# Patient Record
(demographics unavailable — no encounter records)

---

## 2025-06-03 NOTE — PLAN
[TextEntry] : Post Operative Care:  Pt advised of importance of daily weights. Pt advised to call [facility] NP for weight gain of 3 lbs or more. Pt instructed on how to use incentive spirometer every hour, demonstrated proper use.  Pt encouraged to ambulate as much as tolerated, avoiding extreme temperatures outdoors.  Also advised to cleanse incisions daily with mild soap and water and to avoid lotions, powders, ointments or creams near or on the incision.  Low salt, low fat diet encouraged and discussed.  Pt advised to avoid heavy lifting or straining. Pt advised no driving until cleared by Dr. Gonzales.     [facility] team will continue to follow up with pt status.  NP/CCC roles explained with pt understanding, contact information provided. Pt agrees to call with any questions, issues or concerns.  Worsening symptoms reviewed with patient understanding.      FOLLOW UP APPOINTMENTS:  CTS: Dr. Gonzales 6/9/25  CARDIOLOGIST: Dr. Whitaker 2 weeks  EPS- Dr. Blair 2 weeks  PCP: Pt encouraged to follow up within one month of discharge

## 2025-06-03 NOTE — PHYSICAL EXAM
[Sclera] : the sclera and conjunctiva were normal [PERRL With Normal Accommodation] : pupils were equal in size, round, and reactive to light [Neck Appearance] : the appearance of the neck was normal [Respiration, Rhythm And Depth] : normal respiratory rhythm and effort [Exaggerated Use Of Accessory Muscles For Inspiration] : no accessory muscle use [Auscultation Breath Sounds / Voice Sounds] : lungs were clear to auscultation bilaterally [Apical Impulse] : the apical impulse was normal [Heart Rate And Rhythm] : heart rate was normal and rhythm regular [Heart Sounds] : normal S1 and S2 [Heart Sounds Gallop] : no gallops [Murmurs] : no murmurs [Heart Sounds Pericardial Friction Rub] : no pericardial rub [Examination Of The Chest] : the chest was normal in appearance [Chest Visual Inspection Thoracic Asymmetry] : no chest asymmetry [Diminished Respiratory Excursion] : normal chest expansion [2+] : left 2+ [1+] : left 1+ [Bowel Sounds] : normal bowel sounds [Abdomen Soft] : soft [Abdomen Tenderness] : non-tender [Nail Clubbing] : no clubbing  or cyanosis of the fingernails [Involuntary Movements] : no involuntary movements were seen [Skin Color & Pigmentation] : normal skin color and pigmentation [Skin Turgor] : normal skin turgor [] : no rash [No Focal Deficits] : no focal deficits [Oriented To Time, Place, And Person] : oriented to person, place, and time [Impaired Insight] : insight and judgment were intact [Affect] : the affect was normal [Mood] : the mood was normal [Memory Recent] : recent memory was not impaired [Memory Remote] : remote memory was not impaired [FreeTextEntry1] : CT sites clean, dry, and intact. Left SVG site clean, dry, and intact. B/L LE edema +2 pitting edema to feet and calves.

## 2025-06-03 NOTE — ASSESSMENT
[FreeTextEntry1] : S/p CABG x3- continue Asa, Atorvastatin, Furosemide, Metoprolol Tartrate, and Potassium.  DM- pt does not have a blood glucose monitor at home, RX sent to pharmacy for blood glucose monitor, test strips, and lancets. Pt advised to check blood glucose each morning fasting and record reading. Discussed consistent carb diet.

## 2025-06-03 NOTE — HISTORY OF PRESENT ILLNESS
[Diabetes Mellitus] : Diabetes Mellitus [Diet] : controlled with diet [Dyslipidemia] : Dyslipidemia [Hypertension] : Hypertension [Inhaled Medication Therapy] : Inhaled Medication Therapy [FreeTextEntry1] : 76 yo M, former smoker (30 years, quit 2024) PMH of HTN, T2DM (A1C 7.9), HLD, COPD (not on home O2, no recent hospitalizations), prostate cancer s/p prostate seed implant who was recently found to have PET positive left lower lobe 1.5cm lung nodule. During cardiac clearance for wedge resection, he was found to have ischemia after dye injection of a nuclear scan and was transferred to [facility] for cardiac cath. On 5/24/25, he underwent a cath that revealed severe 3V CAD, including  of the RCA with left-right collaterals, severe distal LM disease, severe proximal LAD, and moderate to severe proximal LCX stenosis. On 5/25/25, he was transferred to [facility] under the service of Dr. Gonzales for further management of his CAD prior to intervention for his lung nodule. On 5/26/25, US carotids revealed antegrade flow to vertebral and no carotid stenosis. On 5/27/25, TTE showed EF 44% with akinetic basal and mid inferior wall and septum, entire apex, and entire anterior, basal and mid anterolateral wall hypokinesis. Vein mapping performed with right leg viable for conduit. PST was completed and P2Y12 trended to acceptable level and patient underwent three vessel CABG (LIMA-LAD, SVG-OM, SVG-PDA) on 5/28/25. Intraop course was uneventful. Patient was received in CTICU intubated on no drips with quick extubation to BIPAP (given COPD history) after arrival. POD#1 Patient was noted to have nonconducting P waves for which EP was consulted. He was weaned off HFNC. Substernal and left pleural chest tubes with air leak for which clamp trial was attempted and failed. POD#1 Substernal chest tube clamp trial passed and removed. TTE done prior to PPM insertion revealed EF 45% and no pericardial effusion. Patient underwent dual chamber (Medronic DDD ) pacemaker implant. Left pleural chest tube clamp trial passed. POD#3 PPM interrogated and epicardial wires removed. Left pleural chest tube removed. Continued diuresis. Azithromycin and Rocephin started for tracheobronchitis. POD#4 Lopressor started and tolerated. Lasix held. Of note, patient did not want Oxycodone Rx for discharge. Per Dr. Gonzales, patient is being discharged on Lasix PO and Augmentin x 5 days (for tracheobronchitis). Per Dr. Gonzales, patient is stable for discharge with follow up. Mr. Moore is recovering at home with his wife Emilee; they live with their son and daughter in law.  He is ambulating independently.  Mr Moore verbalized feeling well and sleeping well last night.  Pt denies SOB, incisional pain, abdominal pain, constipation, and urinary retention. [facility] to initiate care.

## 2025-06-11 NOTE — REASON FOR VISIT
[Spouse] : spouse [de-identified] : CABG x 3  (LIMA-LAD, SVG-OM, SVG-PDA) EF 45% [de-identified] : 5/28/25 [de-identified] : Intraop uncomplicated. To ICU intubated on no drip. Extubated to bipap on POD 0. POD 1  Patient was noted to have nonconducting P waves for which EP was consulted. He was weaned off HFNC. Substernal and left pleural chest tubes with air leak for which clamp trial was attempted and failed. POD#1 Substernal chest tube clamp trial passed and removed. TTE done prior to PPM insertion revealed EF 45% and no pericardial effusion. POD s/p PPM by EP. POD 3 ppm interrogated. Azithromycin and rocephin started for tracheobronchitis. Patient discharged home on 6/2.  chest xray today: small effusions. Patient is recuperating well from surgery. He is ambulating and increasing his activities daily. Patient denies fever, chills, dizziness, syncope, shortness of breath, chest pain, palpitations. + peripheral edema   CMP: na 140 K 4.0 BUN 28 creatinine 0.97 LFTs normal

## 2025-06-11 NOTE — REASON FOR VISIT
[Spouse] : spouse [de-identified] : CABG x 3  (LIMA-LAD, SVG-OM, SVG-PDA) EF 45% [de-identified] : 5/28/25 [de-identified] : Intraop uncomplicated. To ICU intubated on no drip. Extubated to bipap on POD 0. POD 1  Patient was noted to have nonconducting P waves for which EP was consulted. He was weaned off HFNC. Substernal and left pleural chest tubes with air leak for which clamp trial was attempted and failed. POD#1 Substernal chest tube clamp trial passed and removed. TTE done prior to PPM insertion revealed EF 45% and no pericardial effusion. POD s/p PPM by EP. POD 3 ppm interrogated. Azithromycin and rocephin started for tracheobronchitis. Patient discharged home on 6/2.  chest xray today: small effusions. Patient is recuperating well from surgery. He is ambulating and increasing his activities daily. Patient denies fever, chills, dizziness, syncope, shortness of breath, chest pain, palpitations. + peripheral edema   CMP: na 140 K 4.0 BUN 28 creatinine 0.97 LFTs normal

## 2025-06-11 NOTE — PHYSICAL EXAM
[] : no respiratory distress [Auscultation Breath Sounds / Voice Sounds] : lungs were clear to auscultation bilaterally [Heart Rate And Rhythm] : heart rate was normal and rhythm regular [Heart Sounds] : normal S1 and S2 [Heart Sounds Gallop] : no gallops [Murmurs] : no murmurs [Heart Sounds Pericardial Friction Rub] : no pericardial rub [Site: ___] : Site: [unfilled] [Clean] : clean [Dry] : dry [Healing Well] : healing well [FreeTextEntry1] : PPM

## 2025-06-11 NOTE — PHYSICAL EXAM
Anesthesia Pre Eval Note    Anesthesia ROS/Med Hx        Anesthetic Complication History:    Patient does not have a history of anesthetic complications      Pulmonary Review:    Current smoker     Neuro/Psych Review:  Patient does not have a neuro/psych history         Cardiovascular Review:   Exercise tolerance: good (>4 METS)  Positive for hyperlipidemia    GI/HEPATIC/RENAL Review:  Patient does not have a GI/hepatic/renalhistory       End/Other Review:  Patient does not have an endo/other history        Relevant Problems   No relevant active problems       Physical Exam     Airway   Mallampati: II  TM Distance: >3 FB  Neck ROM: Full  Neck: Able to place in sniff position  TMJ Mobility: Good    Cardiovascular  Cardiovascular exam normal    General Assessment  General Assessment: Alert and oriented and No acute distress    Dental Exam  Dental exam normal    Pulmonary Exam  Pulmonary exam normal    Abdominal Exam  Abdominal exam normal      Anesthesia Plan:    ASA Status: 2  Anesthesia Type: MAC    Induction: Intravenous  Maintenance: TIVA    Post-op Pain Management: Per Surgeon      Checklist  Reviewed: Lab Results, Nursing Notes, Allergies, Medications, Past Med History, Problem list, NPO Status and Patient Summary  Consent/Risks Discussed Statement:  The proposed anesthetic plan, including its risks and benefits, have been discussed with the Patient along with the risks and benefits of alternatives. Questions were encouraged and answered and the patient and/or representative understands and agrees to proceed.        I discussed with the patient (and/or patient's legal representative) the risks and benefits of the proposed anesthesia plan, MAC, which may include services performed by other anesthesia providers.    Alternative anesthesia plans, if available, were reviewed with the patient (and/or patient's legal representative). Discussion has been held with the patient (and/or patient's legal representative)  regarding risks of anesthesia, which include  emergent situations that may require change in anesthesia plan.    The patient (and/or patient's legal representative) has indicated understanding, his/her questions have been answered, and he/she wishes to proceed with the planned anesthetic.     [] : no respiratory distress [Auscultation Breath Sounds / Voice Sounds] : lungs were clear to auscultation bilaterally [Heart Rate And Rhythm] : heart rate was normal and rhythm regular [Heart Sounds] : normal S1 and S2 [Heart Sounds Gallop] : no gallops [Murmurs] : no murmurs [Heart Sounds Pericardial Friction Rub] : no pericardial rub [Site: ___] : Site: [unfilled] [Clean] : clean [Dry] : dry [Healing Well] : healing well [FreeTextEntry1] : PPM

## 2025-06-11 NOTE — END OF VISIT
[FreeTextEntry3] : I, DELONTE Peace , personally performed the evaluation and management (E/M) services for this established patient who presents today with (a) new problem(s)/exacerbation of (an) existing condition(s).  That E/M includes conducting the clinically appropriate interval history &/or exam, assessing all new/exacerbated conditions, and establishing a new plan of care.  Today, my MEGAN, was here to observe &/or participate in the visit & follow plan of care established by me.

## 2025-06-11 NOTE — REASON FOR VISIT
[Spouse] : spouse [de-identified] : CABG x 3  (LIMA-LAD, SVG-OM, SVG-PDA) EF 45% [de-identified] : 5/28/25 [de-identified] : Intraop uncomplicated. To ICU intubated on no drip. Extubated to bipap on POD 0. POD 1  Patient was noted to have nonconducting P waves for which EP was consulted. He was weaned off HFNC. Substernal and left pleural chest tubes with air leak for which clamp trial was attempted and failed. POD#1 Substernal chest tube clamp trial passed and removed. TTE done prior to PPM insertion revealed EF 45% and no pericardial effusion. POD s/p PPM by EP. POD 3 ppm interrogated. Azithromycin and rocephin started for tracheobronchitis. Patient discharged home on 6/2.  chest xray today: small effusions. Patient is recuperating well from surgery. He is ambulating and increasing his activities daily. Patient denies fever, chills, dizziness, syncope, shortness of breath, chest pain, palpitations. + peripheral edema   CMP: na 140 K 4.0 BUN 28 creatinine 0.97 LFTs normal

## 2025-06-11 NOTE — PHYSICAL EXAM
Yes [] : no respiratory distress [Auscultation Breath Sounds / Voice Sounds] : lungs were clear to auscultation bilaterally [Heart Rate And Rhythm] : heart rate was normal and rhythm regular [Heart Sounds] : normal S1 and S2 [Heart Sounds Gallop] : no gallops [Murmurs] : no murmurs [Heart Sounds Pericardial Friction Rub] : no pericardial rub [Site: ___] : Site: [unfilled] [Clean] : clean [Dry] : dry [Healing Well] : healing well [FreeTextEntry1] : PPM

## 2025-06-11 NOTE — ASSESSMENT
[FreeTextEntry1] : - Follow up with PCP/Cardiologist Dr. Gema Whitaker.  - Continue current medication regimen. Increase Lasix to 40mg once daily x 5 days with K-dur 20meq. Decrease back to 20mg once daily after.  - Continue to increase activity and walk daily as tolerated. Continue to use incentive spirometer.  - No driving or strenuous activity for six weeks after surgery. Avoid lifting >10 to 15lbs for first two months after surgery.  - Continue to use compression stockings. Keep legs elevated above heart when resting/sitting/sleeping. - Call MD if you experience fever, fatigue, dizziness, confusion, syncope, shortness of breath, chest pain not relieved with analgesics, increased redness/drainage from incision. - Follow up in CTS clinic in one month @ [facility].  - CMP drawn in office and reviewed on HIE.

## 2025-06-11 NOTE — ASSESSMENT
[FreeTextEntry1] : - Follow up with PCP/Cardiologist Dr. Gema Whitakre.  - Continue current medication regimen. Increase Lasix to 40mg once daily x 5 days with K-dur 20meq. Decrease back to 20mg once daily after.  - Continue to increase activity and walk daily as tolerated. Continue to use incentive spirometer.  - No driving or strenuous activity for six weeks after surgery. Avoid lifting >10 to 15lbs for first two months after surgery.  - Continue to use compression stockings. Keep legs elevated above heart when resting/sitting/sleeping. - Call MD if you experience fever, fatigue, dizziness, confusion, syncope, shortness of breath, chest pain not relieved with analgesics, increased redness/drainage from incision. - Follow up in CTS clinic in one month @ [facility].  - CMP drawn in office and reviewed on HIE.

## 2025-07-15 NOTE — DISCUSSION/SUMMARY
[Doing Well] : is doing well [Fair Pain Control] : has fair pain control [No Sign of Infection] : is showing no signs of infection [0] : 0

## 2025-07-17 NOTE — REASON FOR VISIT
[Spouse] : spouse [Home] : at home, [unfilled] , at the time of the visit. [Medical Office: ([facility])___] : at the medical office located in  [Telehealth (audio & video)] : This visit was provided via telehealth using real-time 2-way audio visual technology. [Verbal consent obtained from patient] : the patient, [unfilled] [de-identified] : CABG x 3  (LIMA-LAD, SVG-OM, SVG-PDA) EF 45% [de-identified] : 5/28/25 [de-identified] : Patient presents for 2nd postop visit via tele health. He appears well, NAD. Denies c/o chest pain, sob/coy, fever, lower extremity edema, syncope or palpitations.

## 2025-07-17 NOTE — REASON FOR VISIT
[Spouse] : spouse [Home] : at home, [unfilled] , at the time of the visit. [Medical Office: ([facility])___] : at the medical office located in  [Telehealth (audio & video)] : This visit was provided via telehealth using real-time 2-way audio visual technology. [Verbal consent obtained from patient] : the patient, [unfilled] [de-identified] : CABG x 3  (LIMA-LAD, SVG-OM, SVG-PDA) EF 45% [de-identified] : 5/28/25 [de-identified] : Patient presents for 2nd postop visit via tele health. He appears well, NAD. Denies c/o chest pain, sob/coy, fever, lower extremity edema, syncope or palpitations.

## 2025-07-17 NOTE — ASSESSMENT
[FreeTextEntry1] : - Continue current medication regimen. - Continue to increase activity and walk daily as tolerated.  - Avoid lifting >25lbs for 3 months after surgery.  - The patient is discharged from our services and was instructed to follow up with his PCP and Cardiologist Dr. Gema Whitaker . I instructed the patient to call my office if he has any questions or concerns re: his surgery.

## 2025-07-17 NOTE — ASSESSMENT
[FreeTextEntry1] : - Continue current medication regimen. - Continue to increase activity and walk daily as tolerated.  - Avoid lifting >25lbs for 3 months after surgery.  - The patient is discharged from our services and was instructed to follow up with his PCP and Cardiologist Dr. Gema Whitkaer . I instructed the patient to call my office if he has any questions or concerns re: his surgery.

## 2025-07-17 NOTE — REASON FOR VISIT
[Spouse] : spouse [Home] : at home, [unfilled] , at the time of the visit. [Medical Office: ([facility])___] : at the medical office located in  [Telehealth (audio & video)] : This visit was provided via telehealth using real-time 2-way audio visual technology. [Verbal consent obtained from patient] : the patient, [unfilled] [de-identified] : CABG x 3  (LIMA-LAD, SVG-OM, SVG-PDA) EF 45% [de-identified] : 5/28/25 [de-identified] : Patient presents for 2nd postop visit via tele health. He appears well, NAD. Denies c/o chest pain, sob/coy, fever, lower extremity edema, syncope or palpitations.